# Patient Record
Sex: MALE | Race: WHITE | HISPANIC OR LATINO | Employment: FULL TIME | URBAN - METROPOLITAN AREA
[De-identification: names, ages, dates, MRNs, and addresses within clinical notes are randomized per-mention and may not be internally consistent; named-entity substitution may affect disease eponyms.]

---

## 2022-12-10 ENCOUNTER — HOSPITAL ENCOUNTER (EMERGENCY)
Facility: HOSPITAL | Age: 42
Discharge: HOME | End: 2022-12-10
Attending: EMERGENCY MEDICINE | Admitting: EMERGENCY MEDICINE

## 2022-12-10 VITALS
BODY MASS INDEX: 35.44 KG/M2 | SYSTOLIC BLOOD PRESSURE: 128 MMHG | HEIGHT: 63 IN | OXYGEN SATURATION: 97 % | RESPIRATION RATE: 20 BRPM | TEMPERATURE: 97.8 F | HEART RATE: 79 BPM | DIASTOLIC BLOOD PRESSURE: 87 MMHG | WEIGHT: 200 LBS

## 2022-12-10 DIAGNOSIS — E11.9 NEW ONSET TYPE 2 DIABETES MELLITUS: Primary | ICD-10-CM

## 2022-12-10 LAB
ALBUMIN SERPL-MCNC: 4.5 G/DL (ref 3.5–5.2)
ALBUMIN/GLOB SERPL: 1.8 G/DL
ALP SERPL-CCNC: 107 U/L (ref 39–117)
ALT SERPL W P-5'-P-CCNC: 38 U/L (ref 1–41)
ANION GAP SERPL CALCULATED.3IONS-SCNC: 14 MMOL/L (ref 5–15)
AST SERPL-CCNC: 29 U/L (ref 1–40)
BASOPHILS # BLD AUTO: 0.04 10*3/MM3 (ref 0–0.2)
BASOPHILS NFR BLD AUTO: 0.8 % (ref 0–1.5)
BILIRUB SERPL-MCNC: 0.2 MG/DL (ref 0–1.2)
BILIRUB UR QL STRIP: NEGATIVE
BUN SERPL-MCNC: 11 MG/DL (ref 6–20)
BUN/CREAT SERPL: 10.4 (ref 7–25)
CALCIUM SPEC-SCNC: 9.3 MG/DL (ref 8.6–10.5)
CHLORIDE SERPL-SCNC: 99 MMOL/L (ref 98–107)
CLARITY UR: CLEAR
CO2 SERPL-SCNC: 20 MMOL/L (ref 22–29)
COLOR UR: YELLOW
CREAT SERPL-MCNC: 1.06 MG/DL (ref 0.76–1.27)
DEPRECATED RDW RBC AUTO: 40.5 FL (ref 37–54)
EGFRCR SERPLBLD CKD-EPI 2021: 91 ML/MIN/1.73
EOSINOPHIL # BLD AUTO: 0.06 10*3/MM3 (ref 0–0.4)
EOSINOPHIL NFR BLD AUTO: 1.1 % (ref 0.3–6.2)
ERYTHROCYTE [DISTWIDTH] IN BLOOD BY AUTOMATED COUNT: 13.2 % (ref 12.3–15.4)
GLOBULIN UR ELPH-MCNC: 2.5 GM/DL
GLUCOSE BLDC GLUCOMTR-MCNC: 198 MG/DL (ref 70–130)
GLUCOSE BLDC GLUCOMTR-MCNC: 318 MG/DL (ref 70–130)
GLUCOSE SERPL-MCNC: 387 MG/DL (ref 65–99)
GLUCOSE UR STRIP-MCNC: ABNORMAL MG/DL
HBA1C MFR BLD: 12.5 % (ref 4.8–5.6)
HCT VFR BLD AUTO: 43.1 % (ref 37.5–51)
HGB BLD-MCNC: 15.1 G/DL (ref 13–17.7)
HGB UR QL STRIP.AUTO: NEGATIVE
HOLD SPECIMEN: NORMAL
IMM GRANULOCYTES # BLD AUTO: 0.08 10*3/MM3 (ref 0–0.05)
IMM GRANULOCYTES NFR BLD AUTO: 1.5 % (ref 0–0.5)
KETONES UR QL STRIP: ABNORMAL
LEUKOCYTE ESTERASE UR QL STRIP.AUTO: NEGATIVE
LYMPHOCYTES # BLD AUTO: 1.66 10*3/MM3 (ref 0.7–3.1)
LYMPHOCYTES NFR BLD AUTO: 31.7 % (ref 19.6–45.3)
MCH RBC QN AUTO: 29.7 PG (ref 26.6–33)
MCHC RBC AUTO-ENTMCNC: 35 G/DL (ref 31.5–35.7)
MCV RBC AUTO: 84.7 FL (ref 79–97)
MONOCYTES # BLD AUTO: 0.57 10*3/MM3 (ref 0.1–0.9)
MONOCYTES NFR BLD AUTO: 10.9 % (ref 5–12)
NEUTROPHILS NFR BLD AUTO: 2.83 10*3/MM3 (ref 1.7–7)
NEUTROPHILS NFR BLD AUTO: 54 % (ref 42.7–76)
NITRITE UR QL STRIP: NEGATIVE
NRBC BLD AUTO-RTO: 0 /100 WBC (ref 0–0.2)
PH UR STRIP.AUTO: 5.5 [PH] (ref 5–8)
PLATELET # BLD AUTO: 223 10*3/MM3 (ref 140–450)
PMV BLD AUTO: 11.6 FL (ref 6–12)
POTASSIUM SERPL-SCNC: 4.1 MMOL/L (ref 3.5–5.2)
PROT SERPL-MCNC: 7 G/DL (ref 6–8.5)
PROT UR QL STRIP: NEGATIVE
RBC # BLD AUTO: 5.09 10*6/MM3 (ref 4.14–5.8)
SODIUM SERPL-SCNC: 133 MMOL/L (ref 136–145)
SP GR UR STRIP: 1.03 (ref 1–1.03)
UROBILINOGEN UR QL STRIP: ABNORMAL
WBC NRBC COR # BLD: 5.24 10*3/MM3 (ref 3.4–10.8)
WHOLE BLOOD HOLD COAG: NORMAL
WHOLE BLOOD HOLD SPECIMEN: NORMAL

## 2022-12-10 PROCEDURE — 82962 GLUCOSE BLOOD TEST: CPT

## 2022-12-10 PROCEDURE — 81003 URINALYSIS AUTO W/O SCOPE: CPT | Performed by: EMERGENCY MEDICINE

## 2022-12-10 PROCEDURE — 80053 COMPREHEN METABOLIC PANEL: CPT | Performed by: EMERGENCY MEDICINE

## 2022-12-10 PROCEDURE — 99284 EMERGENCY DEPT VISIT MOD MDM: CPT

## 2022-12-10 PROCEDURE — 63710000001 INSULIN REGULAR HUMAN PER 5 UNITS: Performed by: NURSE PRACTITIONER

## 2022-12-10 PROCEDURE — 85025 COMPLETE CBC W/AUTO DIFF WBC: CPT | Performed by: EMERGENCY MEDICINE

## 2022-12-10 PROCEDURE — 83036 HEMOGLOBIN GLYCOSYLATED A1C: CPT | Performed by: NURSE PRACTITIONER

## 2022-12-10 RX ORDER — SODIUM CHLORIDE 0.9 % (FLUSH) 0.9 %
10 SYRINGE (ML) INJECTION AS NEEDED
Status: DISCONTINUED | OUTPATIENT
Start: 2022-12-10 | End: 2022-12-10 | Stop reason: HOSPADM

## 2022-12-10 RX ADMIN — SODIUM CHLORIDE 2000 ML: 9 INJECTION, SOLUTION INTRAVENOUS at 18:49

## 2022-12-10 RX ADMIN — INSULIN HUMAN 5 UNITS: 100 INJECTION, SOLUTION PARENTERAL at 18:49

## 2022-12-11 NOTE — ED PROVIDER NOTES
EMERGENCY DEPARTMENT ENCOUNTER    Pt Name: Gustavo Clay  MRN: [id number]  Pt :   1980  Room Number:  33/33  Date of encounter:  12/10/2022  PCP: Provider, No Known  ED Provider: AMARILYS Humphrey    Historian: Patient      HPI:  Chief Complaint: Hyperglycemia        Context: Gustavo Clay is a 42 y.o. male who presents to the ED c/o discovery of hyperglycemia over the last several days.  He has recognized in the last week that his polydipsia and polyuria may be evidence of type 2 diabetes, conferring with a friend who has the known diagnosis.  That same friend performed glucometer readings prompting patient to seek emergency care.  Patient states that     ROS: is negative for fever, chills. Positive for polydipsia.  Positive for recent illness:  States he had a flu like illness less than two weeks ago.  Negative for chest pain or cough or sob.  GI:  Negative.  :  Positive for polyuria. Positive for generalized weakness, without dizziness or syncope.  No NS complaint or focal weakness.     PAST MEDICAL HISTORY  History reviewed. No pertinent past medical history.      PAST SURGICAL HISTORY  History reviewed. No pertinent surgical history.      FAMILY HISTORY  History reviewed. No pertinent family history.      SOCIAL HISTORY  Social History     Socioeconomic History   • Marital status: Significant Other         ALLERGIES  Patient has no known allergies.        REVIEW OF SYSTEMS  Review of Systems     All systems reviewed and negative except for those discussed in HPI.       PHYSICAL EXAM    I have reviewed the triage vital signs and nursing notes.    ED Triage Vitals [12/10/22 1646]   Temp Heart Rate Resp BP SpO2   97.8 °F (36.6 °C) 85 20 141/87 98 %      Temp src Heart Rate Source Patient Position BP Location FiO2 (%)   Oral Monitor Sitting Left arm --       Physical Exam  GENERAL:   Appears in NAD/  He is an excellent historian.  VSS.   HENT: Nares patent.  EYES: No scleral icterus.  CV: Regular rhythm,  regular rate.  RESPIRATORY: Normal effort.  No audible wheezes, rales or rhonchi.  ABDOMEN: Soft, nontender  MUSCULOSKELETAL: No deformities.   NEURO: Alert, moves all extremities, follows commands.  SKIN: Warm, dry, no rash visualized.        LAB RESULTS  Recent Results (from the past 24 hour(s))   POC Glucose Once    Collection Time: 12/10/22  5:09 PM    Specimen: Blood   Result Value Ref Range    Glucose 318 (H) 70 - 130 mg/dL   Urinalysis With Microscopic If Indicated (No Culture) - Urine, Clean Catch    Collection Time: 12/10/22  5:14 PM    Specimen: Urine, Clean Catch   Result Value Ref Range    Color, UA Yellow Yellow, Straw    Appearance, UA Clear Clear    pH, UA 5.5 5.0 - 8.0    Specific Gravity, UA 1.032 (H) 1.001 - 1.030    Glucose,  mg/dL (2+) (A) Negative    Ketones, UA 80 mg/dL (3+) (A) Negative    Bilirubin, UA Negative Negative    Blood, UA Negative Negative    Protein, UA Negative Negative    Leuk Esterase, UA Negative Negative    Nitrite, UA Negative Negative    Urobilinogen, UA 0.2 E.U./dL 0.2 - 1.0 E.U./dL   Comprehensive Metabolic Panel    Collection Time: 12/10/22  5:46 PM    Specimen: Blood   Result Value Ref Range    Glucose 387 (H) 65 - 99 mg/dL    BUN 11 6 - 20 mg/dL    Creatinine 1.06 0.76 - 1.27 mg/dL    Sodium 133 (L) 136 - 145 mmol/L    Potassium 4.1 3.5 - 5.2 mmol/L    Chloride 99 98 - 107 mmol/L    CO2 20.0 (L) 22.0 - 29.0 mmol/L    Calcium 9.3 8.6 - 10.5 mg/dL    Total Protein 7.0 6.0 - 8.5 g/dL    Albumin 4.50 3.50 - 5.20 g/dL    ALT (SGPT) 38 1 - 41 U/L    AST (SGOT) 29 1 - 40 U/L    Alkaline Phosphatase 107 39 - 117 U/L    Total Bilirubin 0.2 0.0 - 1.2 mg/dL    Globulin 2.5 gm/dL    A/G Ratio 1.8 g/dL    BUN/Creatinine Ratio 10.4 7.0 - 25.0    Anion Gap 14.0 5.0 - 15.0 mmol/L    eGFR 91.0 >60.0 mL/min/1.73   Green Top (Gel)    Collection Time: 12/10/22  5:46 PM   Result Value Ref Range    Extra Tube Hold for add-ons.    Lavender Top    Collection Time: 12/10/22  5:46 PM    Result Value Ref Range    Extra Tube hold for add-on    Gold Top - SST    Collection Time: 12/10/22  5:46 PM   Result Value Ref Range    Extra Tube Hold for add-ons.    Gray Top    Collection Time: 12/10/22  5:46 PM   Result Value Ref Range    Extra Tube Hold for add-ons.    Light Blue Top    Collection Time: 12/10/22  5:46 PM   Result Value Ref Range    Extra Tube Hold for add-ons.    CBC Auto Differential    Collection Time: 12/10/22  5:46 PM    Specimen: Blood   Result Value Ref Range    WBC 5.24 3.40 - 10.80 10*3/mm3    RBC 5.09 4.14 - 5.80 10*6/mm3    Hemoglobin 15.1 13.0 - 17.7 g/dL    Hematocrit 43.1 37.5 - 51.0 %    MCV 84.7 79.0 - 97.0 fL    MCH 29.7 26.6 - 33.0 pg    MCHC 35.0 31.5 - 35.7 g/dL    RDW 13.2 12.3 - 15.4 %    RDW-SD 40.5 37.0 - 54.0 fl    MPV 11.6 6.0 - 12.0 fL    Platelets 223 140 - 450 10*3/mm3    Neutrophil % 54.0 42.7 - 76.0 %    Lymphocyte % 31.7 19.6 - 45.3 %    Monocyte % 10.9 5.0 - 12.0 %    Eosinophil % 1.1 0.3 - 6.2 %    Basophil % 0.8 0.0 - 1.5 %    Immature Grans % 1.5 (H) 0.0 - 0.5 %    Neutrophils, Absolute 2.83 1.70 - 7.00 10*3/mm3    Lymphocytes, Absolute 1.66 0.70 - 3.10 10*3/mm3    Monocytes, Absolute 0.57 0.10 - 0.90 10*3/mm3    Eosinophils, Absolute 0.06 0.00 - 0.40 10*3/mm3    Basophils, Absolute 0.04 0.00 - 0.20 10*3/mm3    Immature Grans, Absolute 0.08 (H) 0.00 - 0.05 10*3/mm3    nRBC 0.0 0.0 - 0.2 /100 WBC   Hemoglobin A1c    Collection Time: 12/10/22  5:46 PM    Specimen: Blood   Result Value Ref Range    Hemoglobin A1C 12.50 (H) 4.80 - 5.60 %   POC Glucose Once    Collection Time: 12/10/22  8:59 PM    Specimen: Blood   Result Value Ref Range    Glucose 198 (H) 70 - 130 mg/dL       If labs were ordered, I independently reviewed the results.        RADIOLOGY  No Radiology Exams Resulted Within Past 24 Hours      PROCEDURES    Procedures    No orders to display       MEDICATIONS GIVEN IN ER    Medications   sodium chloride 0.9 % flush 10 mL (has no administration in  time range)   sodium chloride 0.9 % flush 10 mL (has no administration in time range)   sodium chloride 0.9 % bolus 2,000 mL (0 mL Intravenous Stopped 12/10/22 2104)   insulin regular (humuLIN R,novoLIN R) injection 5 Units (5 Units Intravenous Given 12/10/22 1849)           ED Course as of 12/10/22 2226   Sat Dec 10, 2022   1734 Glucose(!): 500 mg/dL (2+) [MS]   1734 Ketones, UA(!): 80 mg/dL (3+) [MS]   1816 Glucose(!): 387 [MS]   1816 Sodium(!): 133 [MS]   1816 CO2(!): 20.0 [MS]   1816 Ketones, UA(!): 80 mg/dL (3+) [MS]   1816 Glucose(!): 500 mg/dL (2+) [MS]   1816 Glucose(!): 318 [MS]   1839 Patient clearly has hyperglycemia and is likely a new onset diabetic.  It is unknown of course if this was induced by his recent viral illness about 2 weeks ago.  Hemoglobin A1c is pending.  His pH is normal and we will address his hyperglycemia today with fluids and a small dose of IV insulin followed by starting metformin.  He has a new appointment with primary care in January.  He has already initiated low-carb diet, to his credit with his wife support. [MS]   1909 Hemoglobin A1C(!): 12.50 [MS]   2053 Patient's work-up is remarkable for new onset type 2 diabetes.  I had an extensive conversation with the patient regarding the difference between type I and type 2 diabetes and insulin resistance and the risk of uncontrolled hyperglycemia.  Thankfully, his pH is normal and he has a normal anion gap.  He is going to seek diligent self education regarding low carbohydrate diet.  He is going to establish and maintain primary care for close surveillance.  He is going to avoid any undue and unnecessary carbohydrate within reason.  We discussed parameters for concern that would warrant return to the emergency department.  We are going to initiate metformin 500 mg twice daily.  We discussed how to interpret the spectrum of hemoglobin A1c today and he is very thankful for this information [MS]      ED Course User Index  [MS] Roldan  AMARILYS Garcia             AS OF 22:26 EST VITALS:    BP - 128/87  HR - 79  TEMP - 97.8 °F (36.6 °C) (Oral)  O2 SATS - 97%                  DIAGNOSIS  Final diagnoses:   New onset type 2 diabetes mellitus (HCC)         DISPOSITION    DISCHARGE    Patient discharged in stable condition.    Reviewed implications of results, diagnosis, meds, responsibility to follow up, warning signs and symptoms of possible worsening, potential complications and reasons to return to ER.    Patient/Family voiced understanding of above instructions.    Discussed plan for discharge, as there is no emergent indication for admission.  Pt/family is agreeable and understands need for follow up and possible repeat testing.  Pt/family is aware that discharge does not mean that nothing is wrong but that it indicates no emergency is currently present that requires admission and they must continue care with follow-up as given below or with a physician of their choice.     FOLLOW-UP  PATIENT CONNECTION - [place] [id number] [phone]             Medication List      New Prescriptions    metFORMIN 500 MG tablet  Commonly known as: GLUCOPHAGE  Take 1 tablet by mouth 2 (Two) Times a Day With Meals.           Where to Get Your Medications      These medications were sent to [id number] - [place], [place] - [address] AT [facility] [address] - [phone]  - [phone] FX  [address], [facility] [address]    Phone: [phone]   · metFORMIN 500 MG tablet                  Florencia Montilla APRN  12/10/22 3554

## 2022-12-19 ENCOUNTER — NURSE TRIAGE (OUTPATIENT)
Dept: CALL CENTER | Facility: HOSPITAL | Age: 42
End: 2022-12-19

## 2022-12-19 NOTE — TELEPHONE ENCOUNTER
Blood Pressure gets high 179/105 gets dizzy and wants to throw up and gets low 89/69  Felt like he was going to pass out, dizzy felt like he was going to vomit  Blood Glucose staying between 177 and 262.   Vision is getting blurry. Patient wanting to know if blurry vision was normal, Multiple questions about diabetes and management of diabetes.     Reason for Disposition  • [1] Blood glucose 240 - 300 mg/dL (13.3 - 16.7 mmol/L) AND [2] does not  use insulin (e.g., not insulin-dependent; most people with type 2 diabetes)    Additional Information  • Negative: Weakness of the face, arm or leg on one side of the body  • Negative: Followed getting substance in the eye  • Negative: Foreign body or object is or was lodged in the eye  • Negative: Followed an eye injury  • Negative: Followed sun lamp or sun exposure (UV keratitis)  • Negative: Yellow or green discharge (pus) in the eye  • Negative: Pregnant  • Negative: Postpartum (from 0 to 6 weeks after delivery)  • Negative: Complete loss of vision in 1 or both eyes  • Negative: Severe eye pain  • Negative: SEVERE headache  • Negative: Double vision  • Negative: [1] Blurred vision or visual changes AND [2] present now AND [3] sudden onset or new (e.g., minutes, hours, days)  (Exception: seeing floaters / black specks OR previously diagnosed migraine headaches with same symptoms)  • Negative: Patient sounds very sick or weak to the triager  • Negative: Flashes of light  (Exception: brief from pressing on the eyeball)  • Negative: [1] Eye pain AND [2] brief (now gone) blurred vision or visual changes  • Negative: [1] Taking digoxin (e.g., Lanoxin, Digitek, Cardoxin, Lanoxicaps; Toloxin in [place]) AND [2] blurred vision, yellow vision, or yellow-green halos  • Negative: Many floaters in the eye  • Negative: [1] Jaw pain while eating AND [2] age > 50  • Negative: [1] Headache AND [2] age > 50  • Negative: Single floater (i.e., small speck seems to float across the eye)  (Exception: Floater(s) are a chronic symptom and this is unchanged from patient's baseline pattern)  • Negative: [1] Brief (now gone) blurred vision AND [2] unexplained  • Negative: [1] Laser light exposure AND [2] blurred vision present > 15 minutes  • Negative: [1] Blurred vision or visual changes AND [2] gradual onset (e.g., weeks, months)  • Negative: Floaters are a chronic symptom (recurrent or ongoing AND present > 4 weeks)  • Negative: Holds book very close to face or sits very close to TV  • Negative: Closes or covers 1 eye to read  • Negative: Unconscious or difficult to awaken  • Negative: Acting confused (e.g., disoriented, slurred speech)  • Negative: Very weak (e.g., can't stand)  • Negative: Sounds like a life-threatening emergency to the triager  • Negative: [1] Vomiting AND [2] signs of dehydration (e.g., very dry mouth, lightheaded, dark urine)  • Negative: [1] Blood glucose > 240 mg/dL (13.3 mmol/L) AND [2] rapid breathing  • Negative: Blood glucose > 500 mg/dL (27.8 mmol/L)  • Negative: [1] Blood glucose > 240 mg/dL (13.3 mmol/L) AND [2] urine ketones moderate-large (or more than 1+)  • Negative: [1] Blood glucose > 240 mg/dL (13.3 mmol/L) AND [2] blood ketones > 1.4 mmol/L  • Negative: [1] Blood glucose > 240 mg/dL (13.3 mmol/L) AND [2] vomiting AND [3] unable to check for ketones (in blood or urine)  • Negative: [1] New-onset diabetes suspected (e.g., frequent urination, weak, weight loss) AND [2] vomiting or rapid breathing  • Negative: Vomiting lasts > 4 hours  • Negative: Patient sounds very sick or weak to the triager  • Negative: Fever > 100.4 F (38.0 C)  • Negative: Blood glucose > 400 mg/dL (22.2 mmol/L)  • Negative: [1] Blood glucose > 300 mg/dL (16.7 mmol/L) AND [2] two or more times in a row  • Negative: Urine ketones moderate - large (or blood ketones > 1.4 mmol/L)  • Negative: [1] Caller has URGENT medication or insulin pump question AND [2] triager unable to answer question  •  "Negative: [1] Symptoms of high blood sugar (e.g., frequent urination, weak, weight loss) AND [2] not able to test blood glucose  • Negative: New-onset diabetes suspected (e.g., frequent urination, weakness, weight loss)  • Negative: [1] Caller has NON-URGENT medication or insulin pump question AND [2] triager unable to answer question  • Negative: [1] Blood glucose > 300 mg/dL (16.7 mmol/L) AND [2] uses insulin (e.g., insulin-dependent, all people with type 1 diabetes)  • Negative: [1] Blood glucose 240 - 300 mg/dL (13.3 - 16.7 mmol/L) AND [2] uses insulin (e.g., insulin-dependent, all people with type 1 diabetes)  • Negative: [1] Blood glucose > 300 mg/dL (16.7 mmol/L) AND [2] does not  use insulin (e.g., not insulin-dependent; most people with type 2 diabetes)    Answer Assessment - Initial Assessment Questions  1. DESCRIPTION: \"What is the vision loss like? Describe it for me.\" (e.g., complete vision loss, blurred vision, double vision, floaters, etc.)      Blurry vision  2. LOCATION: \"One or both eyes?\" If one, ask: \"Which eye?\"  Both eyes  3. SEVERITY: \"Can you see anything?\" If Yes, ask: \"What can you see?\" (e.g., fine print)     Blurry vision bought reader glasses 2 days ago  4. ONSET: \"When did this begin?\" \"Did it start suddenly or has this been gradual?\"  2 days ago   5. PATTERN: \"Does this come and go, or has it been constant since it started?\"  Constant  6. PAIN: \"Is there any pain in your eye(s)?\"  (Scale 1-10; or mild, moderate, severe)  No  7. CONTACTS-GLASSES: \"Do you wear contacts or glasses?\"  Started wearing reading glasses   8. CAUSE: \"What do you think is causing this visual problem?\"  Diabetes   9. OTHER SYMPTOMS: \"Do you have any other symptoms?\" (e.g., confusion, headache, arm or leg weakness, speech problems)   denies  10. PREGNANCY: \"Is there any chance you are pregnant?\" \"When was your last menstrual period?\"  na    Answer Assessment - Initial Assessment Questions  1. BLOOD GLUCOSE: \"What " "is your blood glucose level?\"       177 -253  2. ONSET: \"When did you check the blood glucose?\"  12/7/22  3. USUAL RANGE: \"What is your glucose level usually?\" (e.g., usual fasting morning value, usual evening value)  177-253  4. KETONES: \"Do you check for ketones (urine or blood test strips)?\" If yes, ask: \"What does the test show now?\"   no  5. TYPE 1 or 2:  \"Do you know what type of diabetes you have?\"  (e.g., Type 1, Type 2, Gestational; doesn't know)   Does not know  6. INSULIN: \"Do you take insulin?\" \"What type of insulin(s) do you use? What is the mode of delivery? (syringe, pen; injection or pump)?\"   no  7. DIABETES PILLS: \"Do you take any pills for your diabetes?\" If yes, ask: \"Have you missed taking any pills recently?\"  Metformin  8. OTHER SYMPTOMS: \"Do you have any symptoms?\" (e.g., fever, frequent urination, difficulty breathing, dizziness, weakness, vomiting)  Sometimes dizziness, weakness and vomiting  9. PREGNANCY: \"Is there any chance you are pregnant?\" \"When was your last menstrual period?\"   na    Protocols used: DIABETES - HIGH BLOOD SUGAR-ADULT-AH, VISION LOSS OR CHANGE-ADULT-AH      "